# Patient Record
Sex: MALE | Race: BLACK OR AFRICAN AMERICAN | Employment: FULL TIME | URBAN - NONMETROPOLITAN AREA
[De-identification: names, ages, dates, MRNs, and addresses within clinical notes are randomized per-mention and may not be internally consistent; named-entity substitution may affect disease eponyms.]

---

## 2022-06-11 ENCOUNTER — HOSPITAL ENCOUNTER (EMERGENCY)
Age: 27
Discharge: HOME | End: 2022-06-11
Attending: EMERGENCY MEDICINE
Payer: COMMERCIAL

## 2022-06-11 VITALS
TEMPERATURE: 97.8 F | HEIGHT: 75 IN | OXYGEN SATURATION: 99 % | DIASTOLIC BLOOD PRESSURE: 78 MMHG | SYSTOLIC BLOOD PRESSURE: 136 MMHG | HEART RATE: 60 BPM | RESPIRATION RATE: 16 BRPM | BODY MASS INDEX: 27.98 KG/M2 | WEIGHT: 225 LBS

## 2022-06-11 DIAGNOSIS — S56.521A LACERATION OF EXTENSOR TENDON OF RIGHT FOREARM, INITIAL ENCOUNTER: Primary | ICD-10-CM

## 2022-06-11 PROCEDURE — 90471 IMMUNIZATION ADMIN: CPT | Performed by: EMERGENCY MEDICINE

## 2022-06-11 PROCEDURE — 6370000000 HC RX 637 (ALT 250 FOR IP): Performed by: EMERGENCY MEDICINE

## 2022-06-11 PROCEDURE — 99284 EMERGENCY DEPT VISIT MOD MDM: CPT

## 2022-06-11 PROCEDURE — 6360000002 HC RX W HCPCS: Performed by: EMERGENCY MEDICINE

## 2022-06-11 PROCEDURE — 12002 RPR S/N/AX/GEN/TRNK2.6-7.5CM: CPT

## 2022-06-11 PROCEDURE — 90715 TDAP VACCINE 7 YRS/> IM: CPT | Performed by: EMERGENCY MEDICINE

## 2022-06-11 RX ORDER — CEPHALEXIN 500 MG/1
500 CAPSULE ORAL ONCE
Status: COMPLETED | OUTPATIENT
Start: 2022-06-11 | End: 2022-06-11

## 2022-06-11 RX ORDER — HYDROCODONE BITARTRATE AND ACETAMINOPHEN 5; 325 MG/1; MG/1
1 TABLET ORAL ONCE
Status: COMPLETED | OUTPATIENT
Start: 2022-06-11 | End: 2022-06-11

## 2022-06-11 RX ORDER — DIAPER,BRIEF,INFANT-TODD,DISP
EACH MISCELLANEOUS ONCE
Status: COMPLETED | OUTPATIENT
Start: 2022-06-11 | End: 2022-06-11

## 2022-06-11 RX ORDER — NAPROXEN 500 MG/1
500 TABLET ORAL 2 TIMES DAILY PRN
Qty: 14 TABLET | Refills: 0 | Status: SHIPPED | OUTPATIENT
Start: 2022-06-11

## 2022-06-11 RX ORDER — CEPHALEXIN 500 MG/1
500 CAPSULE ORAL 3 TIMES DAILY
Qty: 21 CAPSULE | Refills: 0 | Status: SHIPPED | OUTPATIENT
Start: 2022-06-11 | End: 2022-06-18

## 2022-06-11 RX ADMIN — BACITRACIN ZINC 1 G: 500 OINTMENT TOPICAL at 04:32

## 2022-06-11 RX ADMIN — CEPHALEXIN 500 MG: 500 CAPSULE ORAL at 04:32

## 2022-06-11 RX ADMIN — HYDROCODONE BITARTRATE AND ACETAMINOPHEN 1 TABLET: 5; 325 TABLET ORAL at 04:32

## 2022-06-11 RX ADMIN — TETANUS TOXOID, REDUCED DIPHTHERIA TOXOID AND ACELLULAR PERTUSSIS VACCINE, ADSORBED 0.5 ML: 5; 2.5; 8; 8; 2.5 SUSPENSION INTRAMUSCULAR at 04:31

## 2022-06-11 ASSESSMENT — PAIN SCALES - GENERAL: PAINLEVEL_OUTOF10: 9

## 2022-06-11 ASSESSMENT — PAIN DESCRIPTION - LOCATION: LOCATION: ABDOMEN

## 2022-06-11 NOTE — ED NOTES
Patients sutured wound was cleaned with soap and NS, bacitracin was applied, Non-adherant pad, and Kerlix. Patient tolerated well.         Jesús Johnson RN  06/11/22 3143

## 2022-06-11 NOTE — ED PROVIDER NOTES
EMERGENCY DEPARTMENT ENCOUNTER    Patient: Suhail Mckeon  MRN: [id number]  : 1995  Date of Evaluation: 2022  ED Provider:  Travis New MD    CHIEF COMPLAINT  Chief Complaint   Patient presents with    Laceration     Left elbow cut on a stack of metal pans while at work tonight        HPI  Suhail Mckeon is a 32 y.o. male who presents with laceration of lateral proximal right forearm after cutting it on a stack of metal pins at work tonight. Has some mild to moderate throbbing pain at the site of the laceration. Did have some bleeding but bleeding is now controlled. Denies a history of bleeding disorder or being on any blood thinning medications. Denies loss of sensation, decreased motor function or range of motion, or any other associated symptoms or complaints or concerns. Last tetanus vaccine was unknown. REVIEW OF SYSTEMS    Constitutional: negative for fever, chills  Neurological: negative for HA, focal weakness, loss of sensation  Ophthalmic: negative for vision change  ENT: negative for congestion, rhinorrhea  Cardiovascular: negative for chest pain  Respiratory: negative for SOB, cough  GI: negative for abdominal pain, nausea, vomiting, diarrhea, constipation  : negative for dysuria, hematuria  Musculoskeletal: negative for myalgias, decreased ROM, joint swelling  Dermatological: negative for rash, itching  Heme: Negative for bleeding disorder, bruising      PAST MEDICAL HISTORY  Past Medical History:   Diagnosis Date    Asthma        CURRENT MEDICATIONS  [unfilled]    ALLERGIES  No Known Allergies    SURGICAL HISTORY  History reviewed. No pertinent surgical history. FAMILY HISTORY  History reviewed. No pertinent family history.     SOCIAL HISTORY  Social History     Socioeconomic History    Marital status: Single     Spouse name: None    Number of children: None    Years of education: None    Highest education level: None   Occupational History    None   Tobacco Use    Smoking status: Current Every Day Smoker     Packs/day: 1.00     Types: Cigarettes    Smokeless tobacco: Never Used   Substance and Sexual Activity    Alcohol use: No    Drug use: No    Sexual activity: None   Other Topics Concern    None   Social History Narrative    None     Social Determinants of Health     Financial Resource Strain:     Difficulty of Paying Living Expenses: Not on file   Food Insecurity:     Worried About Running Out of Food in the Last Year: Not on file    Hilda of Food in the Last Year: Not on file   Transportation Needs:     Lack of Transportation (Medical): Not on file    Lack of Transportation (Non-Medical):  Not on file   Physical Activity:     Days of Exercise per Week: Not on file    Minutes of Exercise per Session: Not on file   Stress:     Feeling of Stress : Not on file   Social Connections:     Frequency of Communication with Friends and Family: Not on file    Frequency of Social Gatherings with Friends and Family: Not on file    Attends Jew Services: Not on file    Active Member of [address] [place] [address] or Organizations: Not on file    Attends Club or Organization Meetings: Not on file    Marital Status: Not on file   Intimate Partner Violence:     Fear of Current or Ex-Partner: Not on file    Emotionally Abused: Not on file    Physically Abused: Not on file    Sexually Abused: Not on file   Housing Stability:     Unable to Pay for Housing in the Last Year: Not on file    Number of [place] in the Last Year: Not on file    Unstable Housing in the Last Year: Not on file         **Past medical, family and social histories, and nursing notes reviewed and verified by me**      PHYSICAL EXAM  VITAL SIGNS:   ED Triage Vitals [06/11/22 0252]   Enc Vitals Group      /78      Heart Rate 60      Resp 16      Temp 97.8 °F (36.6 °C)      Temp src       SpO2 99 %      Weight 225 lb (102.1 kg)      Height 6' 3\" (1.905 m)      Head Circumference       Peak Flow Pain Score       Pain Loc       Pain Edu? Excl. in [address]? Vitals during ED course were reviewed and are as charted. Constitutional: Minimal distress, Non-toxic appearance  Eyes: Conjunctiva normal, No discharge  HENT: Normocephalic, Atraumatic, airway intact  Neck: Supple, no stridor, no grossly visible or palpable masses  Cardiovascular: Regular rate and rhythm, No murmurs, No rubs, No gallops. 2+ radial pulse  Pulmonary/Chest: Normal breath sounds, No respiratory distress or accessory muscle use, No wheezing, crackles or rhonchi. Extremities: No gross deformities, no edema, no tenderness  Neurologic: Normal motor function, Normal sensory function, No focal deficits  Skin: U-shaped laceration of the lateral proximal right forearm with a flap of skin maintained which is about 7 cm in total length. It is somewhat deep but does not extend into the fascia or underlying musculature. My skin elsewhere is warm, Dry, No erythema, No rash, No cyanosis, No mottling          RADIOLOGY/PROCEDURES/LABS/MEDICATIONS ADMINISTERED:    I have reviewed and interpreted all of the currently available lab results from this visit (if applicable):  No results found for this visit on 06/11/22. ABNORMAL LABS:  Labs Reviewed - No data to display      IMAGING STUDIES ORDERED:  APPLY DRESSING      No orders to display         MEDICATIONS ADMINISTERED:  Medications   HYDROcodone-acetaminophen (NORCO) 5-325 MG per tablet 1 tablet (has no administration in time range)   Tetanus-Diphth-Acell Pertussis (BOOSTRIX) injection 0.5 mL (has no administration in time range)   bacitracin zinc ointment (has no administration in time range)   cephALEXin (KEFLEX) capsule 500 mg (has no administration in time range)         PROCEDURE:    Laceration Repair Procedure Note    Indication: Laceration    Procedure:  The patient was placed in the appropriate position and anesthesia around the laceration was obtained by infiltration using 1% Lidocaine without epinephrine. The area was then cleansed using chlorhexidine and irrigated with normal saline. The laceration was closed in two layers. The subcutaneous layer was closed with 3-0 Vicryl using interrupted sutures. The skin was closed with 4-0 Ethilon using combination of horizontal mattress and simple interrupted sutures and a single corner stitch. There were no additional lacerations requiring repair. The wound area was then dressed with bacitracin and a sterile dressing. Total repaired wound length: 7 cm. Other Items: Suture count: 2 deep Vicryl sutures. 4 horizontal mattress sutures. 1 corner stitch and 1 simple interrupted suture    The patient tolerated the procedure well. Complications: None        COURSE & MEDICAL DECISION MAKING  Last vitals: /78   Pulse 60   Temp 97.8 °F (36.6 °C)   Resp 16   Ht 6' 3\" (1.905 m)   Wt 225 lb (102.1 kg)   SpO2 99%   BMI 28.12 kg/m²     66-year-old male with laceration right forearm. Wound was cleaned and repaired with sutures as noted above. I estimate there is LOW risk for compartment syndrome, complete tendon rupture, acute arterial injury, or retained foreign body, thus I consider the discharge disposition reasonable. Additional workup and treatment in the ED as documented above. Patient reassured and will be discharged home. I have explained to the patient in appropriate terminology our work-up in the ED and their diagnosis. I have also given anticipatory guidance and expectant management of their condition as an outpatient as per my custom. The patient was given clear discharge and follow-up instructions including return to the ER immediately for worsening concerns. The patient has been advised to follow-up with their primary care physician and/or referred physician in the next two to three days or sooner if worsening and to return to the ER immediately as above with any concerns.  I provided the patient counseling with regard to my customary list of strict return precautions as well as return precautions specific to the cause for today's emergency department visit. The patient will return under these provided conditions, but should also return for new concerns or further worsening. Pt and/or family understand and agree with plan      Clinical Impression:  1. Laceration of extensor tendon of right forearm, initial encounter        Disposition referral (if applicable):  Lon Jauregui MD  [address]  [phone]    In 12 days  For suture removal    [facility] Emergency Department  [address]  [address] [facility]  [phone]    If symptoms worsen      Disposition medications (if applicable):  New Prescriptions    CEPHALEXIN (KEFLEX) 500 MG CAPSULE    Take 1 capsule by mouth 3 times daily for 7 days    NAPROXEN (NAPROSYN) 500 MG TABLET    Take 1 tablet by mouth 2 times daily as needed for Pain       ED Provider Disposition Time  DISPOSITION Decision To Discharge 06/11/2022 03:54:11 AM          Electronically signed by: Marquis Caputo M.D., 6/11/2022 4:04 AM      This dictation was created with voice recognition software. While attempts have been made to review the dictation as it is transcribed, on occasion the spoken word can be misinterpreted by the technology leading to omissions or inappropriate words, phrases or sentences.         Darlene Malik MD  06/11/22 0516

## 2024-11-17 ENCOUNTER — HOSPITAL ENCOUNTER (EMERGENCY)
Age: 29
Discharge: HOME | End: 2024-11-17
Attending: STUDENT IN AN ORGANIZED HEALTH CARE EDUCATION/TRAINING PROGRAM

## 2024-11-17 VITALS
DIASTOLIC BLOOD PRESSURE: 92 MMHG | HEART RATE: 93 BPM | SYSTOLIC BLOOD PRESSURE: 152 MMHG | TEMPERATURE: 97.5 F | OXYGEN SATURATION: 99 % | RESPIRATION RATE: 18 BRPM

## 2024-11-17 DIAGNOSIS — Z20.2 STD EXPOSURE: Primary | ICD-10-CM

## 2024-11-17 LAB
BILIRUB UR QL STRIP: NEGATIVE
CLARITY UR: CLEAR
COLOR UR: YELLOW
EPI CELLS #/AREA URNS HPF: ABNORMAL /HPF
GLUCOSE UR STRIP-MCNC: NEGATIVE MG/DL
HGB UR QL STRIP.AUTO: NEGATIVE
KETONES UR STRIP-MCNC: NEGATIVE MG/DL
LEUKOCYTE ESTERASE UR QL STRIP: ABNORMAL
NITRITE UR QL STRIP: NEGATIVE
PH UR STRIP: 7 [PH] (ref 5–8)
PROT UR STRIP-MCNC: NEGATIVE MG/DL
RBC #/AREA URNS HPF: ABNORMAL /HPF
SP GR UR STRIP: 1.02 (ref 1–1.03)
UROBILINOGEN UR STRIP-ACNC: 1 EU/DL (ref 0–1)
WBC #/AREA URNS HPF: ABNORMAL /HPF

## 2024-11-17 PROCEDURE — 87491 CHLMYD TRACH DNA AMP PROBE: CPT

## 2024-11-17 PROCEDURE — 96372 THER/PROPH/DIAG INJ SC/IM: CPT

## 2024-11-17 PROCEDURE — 99284 EMERGENCY DEPT VISIT MOD MDM: CPT

## 2024-11-17 PROCEDURE — 87591 N.GONORRHOEAE DNA AMP PROB: CPT

## 2024-11-17 PROCEDURE — 86593 SYPHILIS TEST NON-TREP QUANT: CPT

## 2024-11-17 PROCEDURE — 2500000003 HC RX 250 WO HCPCS: Performed by: STUDENT IN AN ORGANIZED HEALTH CARE EDUCATION/TRAINING PROGRAM

## 2024-11-17 PROCEDURE — 6360000002 HC RX W HCPCS: Performed by: STUDENT IN AN ORGANIZED HEALTH CARE EDUCATION/TRAINING PROGRAM

## 2024-11-17 PROCEDURE — 81001 URINALYSIS AUTO W/SCOPE: CPT

## 2024-11-17 RX ADMIN — LIDOCAINE HYDROCHLORIDE 500 MG: 10 INJECTION, SOLUTION EPIDURAL; INFILTRATION; INTRACAUDAL; PERINEURAL at 15:41

## 2024-11-17 NOTE — DISCHARGE INSTRUCTIONS
You were seen in the emergency department for suspected exposure to sexually transmitted infections.  Here, we treated you for gonorrhea, and tested you for chlamydia and trichomonas.  [facility]  If you test positive for trichomonas and chlamydia, please come back promptly and we will treat you.    For pain, you can take ibuprofen 600-800 milligrams every 8 hours, alternating it every 4 hours with acetaminophen 975 mg taken every 8 hours.  Therefore, if you take acetaminophen at noon, then take ibuprofen at 4 PM, then acetaminophen again at 8 PM, then ibuprofen at midnight, and so on.    If at some point you have severe shortness of breath, severe nausea or vomiting unable to keep anything down, feels severely weak unable to stand up, feels confused or are lethargic unable to do your normal daily activities, or have any other concerning symptoms, please come back promptly to the emergency department.

## 2024-11-17 NOTE — ED PROVIDER NOTES
Emergency Department Encounter    Patient: Wilian Rodrigez  MRN: [id number]  : 1995  Date of Evaluation: 2024  ED Provider:  Ebenezer Dhaliwal MD    Triage Chief Complaint:   Exposure to STD    [place]:  Wilian Rodrigez is a 29 y.o. male with history significant for asthma that presents for exposure to an STI.  The patient is presenting because for the last 2 weeks she has been sexually active with a single female partner not using condoms consistently.  His partner was recently seen in the emergency department for initial vaginal discharge, was tested for multiple STIs, and diagnosed with gonorrhea.  The patient presents concerned about his potential contact with STIs.  He is asymptomatic, denies any penile pain, ulcers, urethral discharge, dysuria, urinary frequency, hematuria, testicular pain, testicular masses or edema.  Denies any rectal pain, diarrhea or constipation.  No abdominal pain.  No respiratory symptoms.  No fevers or chills.    ROS - see HPI, below listed is current ROS at time of my eval:  Systems reviewed and negative except as above.     Past Medical History:   Diagnosis Date    Asthma      History reviewed. No pertinent surgical history.  History reviewed. No pertinent family history.  Social History     Socioeconomic History    Marital status: Single     Spouse name: Not on file    Number of children: Not on file    Years of education: Not on file    Highest education level: Not on file   Occupational History    Not on file   Tobacco Use    Smoking status: Every Day     Current packs/day: 1.00     Types: Cigarettes    Smokeless tobacco: Never   Substance and Sexual Activity    Alcohol use: No    Drug use: No    Sexual activity: Yes     Partners: Female   Other Topics Concern    Not on file   Social History Narrative    Not on file     Social Determinants of Health     Financial Resource Strain: Not on file   Food Insecurity: Not on file   Transportation Needs: Not on file

## 2024-11-17 NOTE — ED NOTES
Discharge instructions reviewed with patient. Reviewed medications with patient. No additional questions asked.  Voiced understanding. Encouraged patient to follow up as discussed by the ED physician. Reviewed how to access Attributort at discharged with patient. Encourage to sign up either on their smartphone or on the computer to be able to review the information form today's and future visits. Voiced understanding. No additional questions asked. Patient ambulatory without difficulty. Physician aware.Patient instructed to avoid all sexual activity until antibiotics are completed, their partners are tested with treatment complete, and reevaluated by their primary care provider. Patient voiced understanding, No additional questions asked.

## 2024-11-18 LAB
CHLAMYDIA TRACHOMATIS MOLECULAR: NOT DETECTED
NEISSERIA GONORRHOEAE MOLECULAR: DETECTED
TRICHOMONAS VAGINALI, MOLECULAR: NOT DETECTED

## 2024-11-21 ENCOUNTER — TELEPHONE (OUTPATIENT)
Dept: PHARMACY | Age: 29
End: 2024-11-21

## 2024-11-21 NOTE — TELEPHONE ENCOUNTER
Pharmacy Note  ED Culture Follow-up    Wilian Rodrigez is a 29 y.o. male.     Allergies: Patient has no known allergies.     Labs:  Lab Results   Component Value Date    BUN 8 04/22/2014    CREATININE 0.9 04/22/2014    WBC 11.0 04/22/2014     CrCl cannot be calculated (Patient's most recent lab result is older than the maximum 180 days allowed.).    Current antimicrobials:   None    ASSESSMENT:  Micro results:   Genital culture: positive for Neisseria Gonorrhoeae     PLAN:  Need for intervention: Inform patient of positive result    Patient response:   Call attempt #3, did not reach patient. Unable to reach patient after 3 call attempts, sent letter on 11/21/24    Called/sent in prescription to: Not applicable    Please call with any questions. Ext. [phone]    Hanna Franco RPH, PharmD 12:55 PM 11/21/2024

## 2024-11-21 NOTE — PROGRESS NOTES
Pharmacy Note  ED Culture Follow-up    Wilian Rodrigez is a 29 y.o. male.     Allergies: Patient has no known allergies.     Labs:  Lab Results   Component Value Date    BUN 8 04/22/2014    CREATININE 0.9 04/22/2014    WBC 11.0 04/22/2014     CrCl cannot be calculated (Patient's most recent lab result is older than the maximum 180 days allowed.).    Current antimicrobials:   None    ASSESSMENT:  Micro results:   Genital culture: positive for Neisseria Gonorrhoeae     PLAN:  Need for intervention: Inform patient of positive result    Patient response:   Call attempt #3, did not reach patient. Unable to reach patient after 3 call attempts, sent letter on 11/21/24    Called/sent in prescription to: Not applicable    Please call with any questions. Ext. [phone]    Hanna Franco RPH, PharmD 12:55 PM 11/21/2024